# Patient Record
Sex: FEMALE | Race: WHITE | NOT HISPANIC OR LATINO | URBAN - NONMETROPOLITAN AREA
[De-identification: names, ages, dates, MRNs, and addresses within clinical notes are randomized per-mention and may not be internally consistent; named-entity substitution may affect disease eponyms.]

---

## 2020-11-11 ENCOUNTER — OFFICE VISIT (OUTPATIENT)
Dept: URBAN - NONMETROPOLITAN AREA CLINIC 2 | Facility: CLINIC | Age: 65
End: 2020-11-11
Payer: MEDICARE

## 2020-11-11 ENCOUNTER — DASHBOARD ENCOUNTERS (OUTPATIENT)
Age: 65
End: 2020-11-11

## 2020-11-11 DIAGNOSIS — Z12.11 COLON CANCER SCREENING: ICD-10-CM

## 2020-11-11 DIAGNOSIS — K31.84 GASTROPARESIS: ICD-10-CM

## 2020-11-11 DIAGNOSIS — K21.9 GERD: ICD-10-CM

## 2020-11-11 PROBLEM — 235595009 GASTROESOPHAGEAL REFLUX DISEASE: Status: ACTIVE | Noted: 2020-11-11

## 2020-11-11 PROCEDURE — G8484 FLU IMMUNIZE NO ADMIN: HCPCS | Performed by: INTERNAL MEDICINE

## 2020-11-11 PROCEDURE — 1036F TOBACCO NON-USER: CPT | Performed by: INTERNAL MEDICINE

## 2020-11-11 PROCEDURE — 99214 OFFICE O/P EST MOD 30 MIN: CPT | Performed by: INTERNAL MEDICINE

## 2020-11-11 RX ORDER — ONDANSETRON HYDROCHLORIDE 4 MG/1
TAKE 1 TABLET BY ORAL ROUTE 8H FOR 5 DAYS AS NEED FOR NAUSEA TABLET, FILM COATED ORAL
Qty: 30 | Refills: 6 | Status: ACTIVE | COMMUNITY
Start: 2017-10-03

## 2020-11-11 RX ORDER — METOCLOPRAMIDE HYDROCHLORIDE 5 MG/1
TAKE 1 TABLET BY ORAL ROUTE BID PRIOR TO MEALS TABLET ORAL 2
Qty: 60 | Refills: 6 | Status: ACTIVE | COMMUNITY
Start: 2018-02-14

## 2020-11-11 RX ORDER — LEVOTHYROXINE SODIUM 0.05 MG/1
TAKE 1 TABLET (50 MCG) BY ORAL ROUTE ONCE DAILY TABLET ORAL 1
Qty: 0 | Refills: 0 | Status: ACTIVE | COMMUNITY
Start: 1900-01-01

## 2020-11-11 RX ORDER — AMOXICILLIN 500 MG/1
TAKE 2 TABLETS BY ORAL ROUTE 2 TIMES A DAY FOR 14 DAYS TABLET, FILM COATED ORAL 2
Qty: 56 | Refills: 0 | Status: ACTIVE | COMMUNITY
Start: 2017-06-13

## 2020-11-11 RX ORDER — OMEPRAZOLE 20 MG/1
TAKE 1 CAPSULE (20 MG) BY ORAL ROUTE ONCE DAILY BEFORE A MEAL FOR 90 DAYS CAPSULE, DELAYED RELEASE ORAL 1
Qty: 90 | Refills: 3 | Status: ACTIVE | COMMUNITY
Start: 2017-10-03

## 2020-11-11 NOTE — HPI-TODAY'S VISIT:
Nichole returns for follow-up. She was last seen in 2018 for H. pylori and gastroparesis. She has been managing that with dietary control. She is off all of her medications. She seems to be doing quite well. She is here today for repeat colonoscopy. She had a colonoscopy in 2010 by Dr. Tierney. This was normal without polyps. She has not been having any GI related issues with regards to her colon. She denies diarrhea. She denies rectal bleeding. Her diet and weight seem to be maintained.

## 2020-11-11 NOTE — HPI-OTHER HISTORIES
History Of Present Illness    Ms. Alex is here for f/u of bloating, hpylori, nausea, and vomiting. She has had bloating and abdominal swelling years. It was thought to be her GB so she had a CCY in 2015. This did not help. She had labs that showed h.pylori and was treated with prevpak in October 2015. Her stool erradication done in March 2016 came back positive. She was treated again. She had an EGD that showed gastritis and hpylori. She was treated with levaquin and amoxicillin. Her stool in July 2017 was negative for hpylori. Despite meds, she continued to have a loss of appetite and early satiety with nausea and episodes of vomiting. She had a GES that showed delay. She has started on a low residue diet with small frequent meals. Her gastroparesis is now fairly well controlled with diet and FDGuard. Her weight is up. Today, her main complaint is constipation to diarrhea and abdominal swelling.      11/11

## 2020-12-09 ENCOUNTER — OFFICE VISIT (OUTPATIENT)
Dept: URBAN - NONMETROPOLITAN AREA SURGERY CENTER 1 | Facility: SURGERY CENTER | Age: 65
End: 2020-12-09

## 2022-05-31 ENCOUNTER — OFFICE VISIT (OUTPATIENT)
Dept: URBAN - NONMETROPOLITAN AREA SURGERY CENTER 1 | Facility: SURGERY CENTER | Age: 67
End: 2022-05-31
Payer: MEDICARE

## 2022-05-31 DIAGNOSIS — Z12.11 COLON CANCER SCREENING: ICD-10-CM

## 2022-05-31 PROCEDURE — G0121 COLON CA SCRN NOT HI RSK IND: HCPCS | Performed by: INTERNAL MEDICINE

## 2022-05-31 PROCEDURE — G8907 PT DOC NO EVENTS ON DISCHARG: HCPCS | Performed by: INTERNAL MEDICINE
